# Patient Record
Sex: FEMALE | Race: WHITE | URBAN - METROPOLITAN AREA
[De-identification: names, ages, dates, MRNs, and addresses within clinical notes are randomized per-mention and may not be internally consistent; named-entity substitution may affect disease eponyms.]

---
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
## 2023-03-06 PROBLEM — M54.9 BACK PAIN: Status: RESOLVED | Noted: 2023-03-06 | Resolved: 2023-03-06

## 2023-03-06 PROBLEM — M41.129 ACQUIRED ADOLESCENT SCOLIOSIS: Status: ACTIVE | Noted: 2023-03-06

## 2023-03-06 PROBLEM — E66.9 OBESITY: Status: ACTIVE | Noted: 2023-03-06

## 2023-03-06 PROBLEM — F39 MOOD DISORDER (CMS-HCC): Status: ACTIVE | Noted: 2023-03-06

## 2023-03-06 PROBLEM — B36.0 TINEA VERSICOLOR: Status: RESOLVED | Noted: 2023-03-06 | Resolved: 2023-03-06

## 2023-03-06 PROBLEM — R45.4 IRRITABILITY AND ANGER: Status: ACTIVE | Noted: 2023-03-06

## 2023-03-06 PROBLEM — M41.9 SCOLIOSIS: Status: ACTIVE | Noted: 2023-03-06

## 2023-04-06 DIAGNOSIS — F39 MOOD DISORDER (CMS-HCC): ICD-10-CM

## 2023-04-06 RX ORDER — SERTRALINE HYDROCHLORIDE 25 MG/1
TABLET, FILM COATED ORAL
Qty: 30 TABLET | Refills: 0 | Status: SHIPPED | OUTPATIENT
Start: 2023-04-06

## 2023-04-06 NOTE — TELEPHONE ENCOUNTER
Requested Prescriptions     Pending Prescriptions Disp Refills    sertraline (Zoloft) 25 mg tablet [Pharmacy Med Name: SERTRALINE HCL 25 MG TABLET] 30 tablet 0     Sig: TAKE 1 TABLET ORALLY ONCE A DAY AS DIRECTED

## 2023-04-07 DIAGNOSIS — F39 MOOD DISORDER (CMS-HCC): ICD-10-CM

## 2023-04-10 DIAGNOSIS — F39 MOOD DISORDER (CMS-HCC): Primary | ICD-10-CM

## 2023-04-10 RX ORDER — SERTRALINE HYDROCHLORIDE 25 MG/1
TABLET, FILM COATED ORAL
Qty: 30 TABLET | Refills: 0 | OUTPATIENT
Start: 2023-04-10

## 2023-04-10 RX ORDER — SERTRALINE HYDROCHLORIDE 50 MG/1
25 TABLET, FILM COATED ORAL DAILY
Qty: 15 TABLET | Refills: 3 | Status: SHIPPED | OUTPATIENT
Start: 2023-04-10 | End: 2023-05-08

## 2023-05-06 DIAGNOSIS — F39 MOOD DISORDER (CMS-HCC): ICD-10-CM

## 2023-05-08 RX ORDER — SERTRALINE HYDROCHLORIDE 50 MG/1
TABLET, FILM COATED ORAL
Qty: 45 TABLET | Refills: 2 | Status: SHIPPED | OUTPATIENT
Start: 2023-05-08 | End: 2024-02-05 | Stop reason: SDUPTHER

## 2023-05-08 NOTE — TELEPHONE ENCOUNTER
Pharmacy requesting a 90 day supply    Requested Prescriptions     Pending Prescriptions Disp Refills    sertraline (Zoloft) 50 mg tablet [Pharmacy Med Name: SERTRALINE HCL 50 MG TABLET] 45 tablet 2     Sig: TAKE 1/2 A TABLET (25 MG) BY MOUTH ONCE DAILY.

## 2024-02-05 DIAGNOSIS — F39 MOOD DISORDER (CMS-HCC): ICD-10-CM

## 2024-02-05 RX ORDER — SERTRALINE HYDROCHLORIDE 50 MG/1
TABLET, FILM COATED ORAL
Qty: 45 TABLET | Refills: 2 | Status: SHIPPED | OUTPATIENT
Start: 2024-02-05

## 2024-02-05 NOTE — TELEPHONE ENCOUNTER
Pharmacy requests prescription below    Last Office Visit: 5/11/22    Requested Prescriptions     Pending Prescriptions Disp Refills    sertraline (Zoloft) 50 mg tablet [Pharmacy Med Name: SERTRALINE HCL 50 MG TABLET] 45 tablet 2     Sig: TAKE 1/2 A TABLET (25 MG) BY MOUTH ONCE DAILY.

## 2024-10-29 DIAGNOSIS — F39 MOOD DISORDER (CMS-HCC): ICD-10-CM

## 2024-10-29 RX ORDER — SERTRALINE HYDROCHLORIDE 50 MG/1
TABLET, FILM COATED ORAL
Qty: 45 TABLET | Refills: 2 | Status: SHIPPED | OUTPATIENT
Start: 2024-10-29

## 2025-08-06 DIAGNOSIS — F39 MOOD DISORDER: ICD-10-CM

## 2025-08-06 RX ORDER — SERTRALINE HYDROCHLORIDE 50 MG/1
25 TABLET, FILM COATED ORAL
Qty: 45 TABLET | Refills: 2 | Status: SHIPPED | OUTPATIENT
Start: 2025-08-06

## 2025-08-06 NOTE — TELEPHONE ENCOUNTER
Pharmacy requests prescription below    Last Office Visit: Visit date not found   Next Office Visit: Visit date not found     Requested Prescriptions     Pending Prescriptions Disp Refills    sertraline (Zoloft) 50 mg tablet [Pharmacy Med Name: SERTRALINE HCL 50 MG TABLET] 45 tablet 2     Sig: TAKE 1/2 TABLET (25 MG) BY MOUTH ONCE DAILY